# Patient Record
Sex: MALE | Race: WHITE | NOT HISPANIC OR LATINO | Employment: OTHER | URBAN - METROPOLITAN AREA
[De-identification: names, ages, dates, MRNs, and addresses within clinical notes are randomized per-mention and may not be internally consistent; named-entity substitution may affect disease eponyms.]

---

## 2024-10-01 ENCOUNTER — TELEPHONE (OUTPATIENT)
Dept: UROLOGY | Facility: CLINIC | Age: 74
End: 2024-10-01
Payer: MEDICARE

## 2024-10-01 NOTE — TELEPHONE ENCOUNTER
Pt was unable to make appt today, because he was lost. Pt was rescheduled to Thurs 10/03. Called pt to find out the nature of his appt. Appt reason was unclear. Left voicemail and call back number. QR

## 2024-10-03 ENCOUNTER — OFFICE VISIT (OUTPATIENT)
Dept: UROLOGY | Facility: CLINIC | Age: 74
End: 2024-10-03
Payer: MEDICARE

## 2024-10-03 VITALS — WEIGHT: 182.13 LBS | HEIGHT: 69 IN | BODY MASS INDEX: 26.97 KG/M2

## 2024-10-03 DIAGNOSIS — Z85.47 HISTORY OF TESTICULAR CANCER: ICD-10-CM

## 2024-10-03 DIAGNOSIS — N50.89 TESTICULAR MASS: Primary | ICD-10-CM

## 2024-10-03 PROCEDURE — 99213 OFFICE O/P EST LOW 20 MIN: CPT | Mod: PBBFAC,PO

## 2024-10-03 PROCEDURE — 99203 OFFICE O/P NEW LOW 30 MIN: CPT | Mod: S$PBB,,,

## 2024-10-03 PROCEDURE — 99999 PR PBB SHADOW E&M-EST. PATIENT-LVL III: CPT | Mod: PBBFAC,,,

## 2024-10-03 RX ORDER — BUPROPION HYDROCHLORIDE 300 MG/1
300 TABLET ORAL
COMMUNITY
Start: 2024-09-30

## 2024-10-03 RX ORDER — DULOXETIN HYDROCHLORIDE 60 MG/1
60 CAPSULE, DELAYED RELEASE ORAL
COMMUNITY
Start: 2021-09-30

## 2024-10-03 RX ORDER — TRAZODONE HYDROCHLORIDE 50 MG/1
50 TABLET ORAL NIGHTLY
COMMUNITY

## 2024-10-03 RX ORDER — VALACYCLOVIR HYDROCHLORIDE 1 G/1
TABLET, FILM COATED ORAL
COMMUNITY
Start: 1989-09-30

## 2024-10-04 ENCOUNTER — HOSPITAL ENCOUNTER (OUTPATIENT)
Dept: RADIOLOGY | Facility: HOSPITAL | Age: 74
Discharge: HOME | End: 2024-10-04
Payer: MEDICARE

## 2024-10-04 ENCOUNTER — TELEPHONE (OUTPATIENT)
Dept: UROLOGY | Facility: CLINIC | Age: 74
End: 2024-10-04
Payer: MEDICARE

## 2024-10-04 DIAGNOSIS — N50.89 TESTICULAR MASS: ICD-10-CM

## 2024-10-04 PROCEDURE — 93975 VASCULAR STUDY: CPT | Mod: 26,,, | Performed by: RADIOLOGY

## 2024-10-04 PROCEDURE — 76870 US EXAM SCROTUM: CPT | Mod: 26,,, | Performed by: RADIOLOGY

## 2024-10-04 PROCEDURE — 93975 VASCULAR STUDY: CPT | Mod: TC,PO

## 2024-10-04 NOTE — PROGRESS NOTES
"[facility] Note  Staff: JEAN Ybarra    PCP: None    Chief Complaint: Testicular Mass    Subjective:        HPI: Nahid Bauer is a 74 y.o. male NEW PATIENT presents today for evaluation of left testicular mass. 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 He states he first noticed this area about 2 weeks ago. He states it feels different than his prior testicular cancer. He denies swelling or tenderness. He denies dysuria, hematuria, fever, flank pain, and difficulty urinating.     Questions asked the pt during ov today:  Dysuria: No  Gross Hematuria:No  Straining:No, Hesistancy:No, Intermittency:No}, Weak stream:No    Last PSA Screening: No results found for: "PSA", "PSADIAG"    History of Kidney Stones?:  No    Constipation issues?:  No    REVIEW OF SYSTEMS:  Review of Systems   Constitutional: Negative.  Negative for chills and fever.   Gastrointestinal: Negative.  Negative for abdominal pain, constipation, diarrhea, nausea and vomiting.   Genitourinary: Negative.  Negative for dysuria, flank pain, frequency, hematuria and urgency.   Musculoskeletal: Negative.  Negative for back pain.       PMHx:  History reviewed. No pertinent past medical history.    PSHx:  History reviewed. No pertinent surgical history.    Fam Hx:   malignancies: No    kidney stones: No     Soc Hx:  , lives in [place]    Allergies:  Patient has no known allergies.    Medications: reviewed     Objective:   There were no vitals filed for this visit.    Physical Exam  Constitutional:       Appearance: Normal appearance.   Pulmonary:      Effort: Pulmonary effort is normal.   Abdominal:      General: There is no distension.      Palpations: Abdomen is soft.      Tenderness: There is no abdominal tenderness.   Musculoskeletal:         General: Normal range of motion.      Cervical back: Normal range of motion.   Skin:     General: Skin is warm. "   Neurological:      Mental Status: He is alert and oriented to person, place, and time.   Psychiatric:         Mood and Affect: Mood normal.         Behavior: Behavior normal.          EXAM performed by me in office today:  small cyst like area palpated under skin, not attached to testicle  No scrotal rashes, cysts or lesions  Epididymis normal in size, no tenderness  Testes normal and size,  no masses  Urethral meatus normal without discharge      Assessment:       1. Testicular mass    2. History of testicular cancer          Plan:     US Scrotum and Testicles ordered and scheduled    F/u per treatment plan    [facility]: Active    HALEIGH YbarraC

## 2024-10-04 NOTE — TELEPHONE ENCOUNTER
----- Message from Sahra Mcgregor NP sent at 10/4/2024 11:31 AM CDT -----  US shows no masses, normal US and exam

## 2024-10-08 DIAGNOSIS — M25.511 RIGHT SHOULDER PAIN, UNSPECIFIED CHRONICITY: Primary | ICD-10-CM

## 2024-10-09 ENCOUNTER — OFFICE VISIT (OUTPATIENT)
Dept: ORTHOPEDICS | Facility: CLINIC | Age: 74
End: 2024-10-09
Payer: MEDICARE

## 2024-10-09 ENCOUNTER — HOSPITAL ENCOUNTER (OUTPATIENT)
Dept: RADIOLOGY | Facility: HOSPITAL | Age: 74
Discharge: HOME | End: 2024-10-09
Attending: ORTHOPAEDIC SURGERY
Payer: MEDICARE

## 2024-10-09 VITALS — RESPIRATION RATE: 18 BRPM | HEIGHT: 69 IN | WEIGHT: 182.13 LBS | BODY MASS INDEX: 26.97 KG/M2

## 2024-10-09 DIAGNOSIS — M25.511 RIGHT SHOULDER PAIN, UNSPECIFIED CHRONICITY: Primary | ICD-10-CM

## 2024-10-09 DIAGNOSIS — M75.101 TEAR OF RIGHT ROTATOR CUFF, UNSPECIFIED TEAR EXTENT, UNSPECIFIED WHETHER TRAUMATIC: ICD-10-CM

## 2024-10-09 DIAGNOSIS — M75.100 ROTATOR CUFF SYNDROME, UNSPECIFIED LATERALITY: Primary | ICD-10-CM

## 2024-10-09 DIAGNOSIS — M25.511 RIGHT SHOULDER PAIN, UNSPECIFIED CHRONICITY: ICD-10-CM

## 2024-10-09 PROCEDURE — 73030 X-RAY EXAM OF SHOULDER: CPT | Mod: TC,PO,RT

## 2024-10-09 PROCEDURE — 99204 OFFICE O/P NEW MOD 45 MIN: CPT | Mod: S$PBB,,, | Performed by: ORTHOPAEDIC SURGERY

## 2024-10-09 PROCEDURE — 73030 X-RAY EXAM OF SHOULDER: CPT | Mod: 26,RT,, | Performed by: RADIOLOGY

## 2024-10-09 PROCEDURE — 99999 PR PBB SHADOW E&M-EST. PATIENT-LVL III: CPT | Mod: PBBFAC,,, | Performed by: ORTHOPAEDIC SURGERY

## 2024-10-09 PROCEDURE — 99213 OFFICE O/P EST LOW 20 MIN: CPT | Mod: PBBFAC,25,PO | Performed by: ORTHOPAEDIC SURGERY

## 2024-10-09 NOTE — PROGRESS NOTES
History reviewed. No pertinent past medical history.    History reviewed. No pertinent surgical history.    Current Outpatient Medications   Medication Sig    buPROPion (WELLBUTRIN XL) 300 MG 24 hr tablet 300 mg.    DULoxetine (CYMBALTA) 60 MG capsule 60 mg.    traZODone (DESYREL) 50 MG tablet Take 50 mg by mouth every evening.    valACYclovir (VALTREX) 1000 MG tablet      No current facility-administered medications for this visit.       Review of patient's allergies indicates:  No Known Allergies    No family history on file.    Social History     Socioeconomic History    Marital status:      Social Drivers of Health     Financial Resource Strain: Low Risk  (9/30/2024)    Overall Financial Resource Strain (CARDIA)     Difficulty of Paying Living Expenses: Not very hard   Food Insecurity: Food Insecurity Present (9/30/2024)    Hunger Vital Sign     Worried About Running Out of Food in the Last Year: Sometimes true     Ran Out of Food in the Last Year: Never true   Physical Activity: Insufficiently Active (9/30/2024)    Exercise Vital Sign     Days of Exercise per Week: 3 days     Minutes of Exercise per Session: 30 min   Stress: No Stress Concern Present (9/30/2024)    Citizen of [place] [employer] - Occupational Stress Questionnaire     Feeling of Stress : Only a little   Housing Stability: High Risk (9/30/2024)    Housing Stability Vital Sign     Unable to Pay for Housing in the Last Year: Yes       Chief Complaint:   Chief Complaint   Patient presents with    Right Shoulder - Pain       History of present illness:  74-year-old right-hand-dominant male seen for right shoulder pain that started in August.  Patient remembers aggravating it while moving.  Patient was having to a lot of lifting.  Patient has tried Tylenol but it does not improve.  Pain particularly with external rotation.  Pain along the front of his shoulder as well as along the lateral shoulder.  Pain is an 8/10 and not getting  better.        Review of Systems:    Constitution: Negative for chills, fever, and sweats.  Negative for unexplained weight loss.    HENT:  Negative for headaches and blurry vision.    Cardiovascular:Negative for chest pain or irregular heart beat. Negative for hypertension.    Respiratory:  Negative for cough and shortness of breath.    Gastrointestinal: Negative for abdominal pain, heartburn, melena, nausea, and vomitting.    Genitourinary:  Negative bladder incontinence and dysuria.    Musculoskeletal:  See HPI    Neurological: Negative for numbness.    Psychiatric/Behavioral: Negative for depression.  The patient is not nervous/anxious.      Endocrine: Negative for polyuria    Hematologic/Lymphatic: Negative for bleeding problem.  Does not bruise/bleed easily.    Skin: Negative for poor would healing and rash      Physical Examination:    Vital Signs:    Vitals:    10/09/24 1411   Resp: 18       Body mass index is 27.29 kg/m².    This a well-developed, well nourished patient in no acute distress.  They are alert and oriented and cooperative to examination.  Pt. walks without an antalgic gait.      Examination of the right shoulder shows no rashes or erythema. There are no masses, ecchymosis, or atrophy. The patient has full range of motion in forward flexion, external rotation, and internal rotation to the mid T-spine. The patient has positive Sotomayor test. - Neer - Loudoun's test.  Positive Speeds test.  Pain particularly with external rotation Nontender to palpation over a.c. joint. Normal stability anteriorly, posteriorly, and negative sulcus sign. Passive range of motion: Forward flexion of 180°, external rotation at 90° of 90°, internal rotation of 50°, and external rotation at 0° of 50°. 2+ radial pulse. Intact axillary, radial, median and ulnar sensation.  4+ out of 5 resisted forward flexion, external rotation, and negative lift off test.      X-rays:  X-rays of the right shoulder ordered and reviewed  which show small ossicle within the AC joint.  Well-maintained glenohumeral joint         Assessment:  Possible right rotator cuff tear    Plan:  I reviewed the findings with him today.  Recommended an MRI to evaluate the right shoulder.  Patient had acute incident while lifting a lot of boxes in his had worsening shoulder pain now for 2 months.  Follow up after the MRI is completed.            All previous pertinent notes including ER visits, physical therapy visits, other orthopedic visits as well as other care for the same musculoskeletal problem were reviewed.  All pertinent lab values and previous imaging was reviewed pertinent to the current visit.    This note was created using [employer] voice recognition software that occasionally misinterpreted phrases or words.    Consult note is delivered via Epic messaging service.

## 2025-04-15 ENCOUNTER — TELEPHONE (OUTPATIENT)
Dept: PSYCHIATRY | Facility: CLINIC | Age: 75
End: 2025-04-15
Payer: MEDICARE

## 2025-04-16 ENCOUNTER — OFFICE VISIT (OUTPATIENT)
Dept: FAMILY MEDICINE | Facility: CLINIC | Age: 75
End: 2025-04-16
Payer: MEDICARE

## 2025-04-16 ENCOUNTER — PATIENT MESSAGE (OUTPATIENT)
Dept: FAMILY MEDICINE | Facility: CLINIC | Age: 75
End: 2025-04-16

## 2025-04-16 VITALS
HEART RATE: 99 BPM | SYSTOLIC BLOOD PRESSURE: 138 MMHG | WEIGHT: 187.63 LBS | HEIGHT: 68 IN | BODY MASS INDEX: 28.44 KG/M2 | TEMPERATURE: 99 F | OXYGEN SATURATION: 100 % | DIASTOLIC BLOOD PRESSURE: 76 MMHG

## 2025-04-16 DIAGNOSIS — F32.A CHRONIC DEPRESSION: ICD-10-CM

## 2025-04-16 DIAGNOSIS — R94.2 ABNORMAL LUNG SCAN: ICD-10-CM

## 2025-04-16 DIAGNOSIS — E66.3 OVERWEIGHT: ICD-10-CM

## 2025-04-16 DIAGNOSIS — G47.09 OTHER INSOMNIA: ICD-10-CM

## 2025-04-16 DIAGNOSIS — Z13.6 ENCOUNTER FOR ABDOMINAL AORTIC ANEURYSM (AAA) SCREENING: ICD-10-CM

## 2025-04-16 DIAGNOSIS — Z98.890 HISTORY OF PROSTATE BIOPSY: ICD-10-CM

## 2025-04-16 DIAGNOSIS — Z76.89 ENCOUNTER TO ESTABLISH CARE: Primary | ICD-10-CM

## 2025-04-16 DIAGNOSIS — Z13.220 LIPID SCREENING: ICD-10-CM

## 2025-04-16 PROBLEM — F17.211 CIGARETTE NICOTINE DEPENDENCE IN REMISSION: Status: ACTIVE | Noted: 2025-04-16

## 2025-04-16 PROBLEM — C62.11 MALIGNANT NEOPLASM OF DESCENDED RIGHT TESTIS: Status: ACTIVE | Noted: 2025-04-16

## 2025-04-16 PROBLEM — Z90.79 HISTORY OF UNILATERAL ORCHIECTOMY: Status: ACTIVE | Noted: 2025-04-16

## 2025-04-16 PROCEDURE — 99214 OFFICE O/P EST MOD 30 MIN: CPT | Mod: PBBFAC,PO | Performed by: STUDENT IN AN ORGANIZED HEALTH CARE EDUCATION/TRAINING PROGRAM

## 2025-04-16 PROCEDURE — 99999 PR PBB SHADOW E&M-EST. PATIENT-LVL IV: CPT | Mod: PBBFAC,,, | Performed by: STUDENT IN AN ORGANIZED HEALTH CARE EDUCATION/TRAINING PROGRAM

## 2025-04-16 RX ORDER — DULOXETIN HYDROCHLORIDE 60 MG/1
60 CAPSULE, DELAYED RELEASE ORAL DAILY
Qty: 90 CAPSULE | Refills: 0 | Status: SHIPPED | OUTPATIENT
Start: 2025-04-16

## 2025-04-16 RX ORDER — TRAZODONE HYDROCHLORIDE 50 MG/1
50 TABLET ORAL NIGHTLY
Qty: 90 TABLET | Refills: 0 | Status: SHIPPED | OUTPATIENT
Start: 2025-04-16

## 2025-04-16 NOTE — PROGRESS NOTES
Chief Complaint   Patient presents with    Establish Care     Medication refills.        Subjective   Patient ID: Campbell Bauer is a 74 y.o. male.    HPI  Nahid Bauer is a 74 y.o. with a PMHx depression, hx of cigarette use who presents today to establish care with provider.   The patients previous PCP was Sandy Cruz and they last saw them on 6/2024 in [place]. He moved at the end of 8/2024    Concerns today:   1)depression and insomnia - for a couple of years for which he was being seen by psychiatrist - Dr Author ERICA Lockhart  2)he was told he has a spot on his lung that he was having monitored      Current medication list: wellbutrin 300mg XR, cymblata 60mg, trazodone 50mg nightly   Allergies: lisinopril   PSHx: left knee mencisus surgery; right testicular cancer s/p surgical removal in 1996   PFHx: father - prostate cancer    Social  Tobacco use:  quit 39yrs old - smoked for 25-30yrs for 1/2PPD   Alcohol use: rarely   Illicit drug use: marijuana   Lives with: wife  Employment/Work: massage therapist at [employer]   Any children: daghter and 2 sons     Lifestyle  Current exercise regimen:no structured routine   Current diet: cook mostly     Screening Hx:  Dental care: >10-12yrs ago  Annual eye exam: 8/28/2023      PHQ9 screen:       4/16/2025   PHQ-9 Depression Patient Health Questionnaire   Over the last two weeks how often have you been bothered by little interest or pleasure in doing things 1   Over the last two weeks how often have you been bothered by feeling down, depressed or hopeless 0      Prostate 11/24/2020 - bx  Colorectal cancer screening - Last colonoscopy: within the last 5yrs   - Starting age 45 in high risk populations. pt average risk  Lung cancer screening - LDCT: NA   - Risk factors: age 50 to 80 who have 20-pack year smoking hx and currently smoke or have quit within the last 15 years  DEXA - Last scan: NA   - A 65, or younger if risk factors. Use FRAX or SCORE  AAA screening - Last US:never  "had one   - Male age 65-75 with hx of smoking or family hx      Review of Systems  Objective      Body mass index is 28.53 kg/m².  Vitals:    04/16/25 1450   BP: 138/76   Pulse: 99   Temp: 98.6 °F (37 °C)   TempSrc: Oral   SpO2: 100%   Weight: 85.1 kg (187 lb 9.8 oz)   Height: 5' 8" (1.727 m)      Physical Exam    Procedures     No results found for this or any previous visit.    Assessment & Plan   Encounter to establish care  - Patient is hemodynamically stable with no acute findings on physical exam.  -     Comprehensive Metabolic Panel; Future; Expected date: 04/16/2025    History of prostate biopsy  - He reports that he has had 2 biopsies before - most recent was 11/24/2020  -     PSA Total (Reflex To Free); Future; Expected date: 04/16/2025    Lipid screening  -     Lipid Panel; Future; Expected date: 04/16/2025    Chronic depression  - The patient reports that he is on Cymbalta 60mg and Wellbutrin XL 300mg from his previous psychiatrist Dr Lockhart in [place]. He would like to be seen by psychiatry in [place] area   -     Ambulatory referral/consult to Psychiatry; Future; Expected date: 04/23/2025  -     DULoxetine (CYMBALTA) 60 MG capsule; Take 1 capsule (60 mg total) by mouth once daily.  Dispense: 90 capsule; Refill: 0    Other insomnia  - The patient reports that he has been on trazodone for sleep from his psychiatrist Dr. Lockhart  -     Ambulatory referral/consult to Psychiatry; Future; Expected date: 04/23/2025  -     traZODone (DESYREL) 50 MG tablet; Take 1 tablet (50 mg total) by mouth every evening.  Dispense: 90 tablet; Refill: 0    Abnormal lung scan  - The patient reports that he has been getting monitored for a lung "spot" by a pulmonologist in [place]. He states he is due for follow up  -     Ambulatory referral/consult to Pulmonology; Future; Expected date: 04/23/2025    Encounter for abdominal aortic aneurysm (AAA) screening  - Hx of smoking 1/2PPD for 25-30yrs - he quit 37yrs ago  -     US Abdominal " Aorta; Future; Expected date: 04/16/2025            Patient informed of transferring medical history from prior provider to [facility].   No follow-ups on file.

## 2025-04-17 NOTE — TELEPHONE ENCOUNTER
Sent a message that provider is out of the office. A release of information is needed to get his records from another provider.

## 2025-04-21 ENCOUNTER — PATIENT MESSAGE (OUTPATIENT)
Dept: PSYCHIATRY | Facility: CLINIC | Age: 75
End: 2025-04-21
Payer: MEDICARE

## 2025-05-01 ENCOUNTER — LAB VISIT (OUTPATIENT)
Dept: LAB | Facility: HOSPITAL | Age: 75
End: 2025-05-01
Attending: STUDENT IN AN ORGANIZED HEALTH CARE EDUCATION/TRAINING PROGRAM
Payer: MEDICARE

## 2025-05-01 DIAGNOSIS — E66.3 OVERWEIGHT: ICD-10-CM

## 2025-05-01 DIAGNOSIS — Z76.89 ENCOUNTER TO ESTABLISH CARE: ICD-10-CM

## 2025-05-01 DIAGNOSIS — Z13.220 LIPID SCREENING: ICD-10-CM

## 2025-05-01 LAB
ALBUMIN SERPL BCP-MCNC: 3.6 G/DL (ref 3.5–5.2)
ALP SERPL-CCNC: 78 UNIT/L (ref 40–150)
ALT SERPL W/O P-5'-P-CCNC: 19 UNIT/L (ref 10–44)
ANION GAP (OHS): 6 MMOL/L (ref 8–16)
AST SERPL-CCNC: 19 UNIT/L (ref 11–45)
BILIRUB SERPL-MCNC: 0.5 MG/DL (ref 0.1–1)
BUN SERPL-MCNC: 30 MG/DL (ref 8–23)
CALCIUM SERPL-MCNC: 8.7 MG/DL (ref 8.7–10.5)
CHLORIDE SERPL-SCNC: 108 MMOL/L (ref 95–110)
CHOLEST SERPL-MCNC: 178 MG/DL (ref 120–199)
CHOLEST/HDLC SERPL: 4 {RATIO} (ref 2–5)
CO2 SERPL-SCNC: 25 MMOL/L (ref 23–29)
CREAT SERPL-MCNC: 1.1 MG/DL (ref 0.5–1.4)
GFR SERPLBLD CREATININE-BSD FMLA CKD-EPI: >60 ML/MIN/1.73/M2
GLUCOSE SERPL-MCNC: 93 MG/DL (ref 70–110)
HDLC SERPL-MCNC: 45 MG/DL (ref 40–75)
HDLC SERPL: 25.3 % (ref 20–50)
LDLC SERPL CALC-MCNC: 102.8 MG/DL (ref 63–159)
NONHDLC SERPL-MCNC: 133 MG/DL
POTASSIUM SERPL-SCNC: 4.7 MMOL/L (ref 3.5–5.1)
PROT SERPL-MCNC: 7 GM/DL (ref 6–8.4)
SODIUM SERPL-SCNC: 139 MMOL/L (ref 136–145)
TRIGL SERPL-MCNC: 151 MG/DL (ref 30–150)

## 2025-05-01 PROCEDURE — 36415 COLL VENOUS BLD VENIPUNCTURE: CPT | Mod: PO

## 2025-05-01 PROCEDURE — 82465 ASSAY BLD/SERUM CHOLESTEROL: CPT

## 2025-05-01 PROCEDURE — 82040 ASSAY OF SERUM ALBUMIN: CPT

## 2025-05-06 ENCOUNTER — TELEPHONE (OUTPATIENT)
Dept: FAMILY MEDICINE | Facility: CLINIC | Age: 75
End: 2025-05-06
Payer: MEDICARE

## 2025-05-06 ENCOUNTER — RESULTS FOLLOW-UP (OUTPATIENT)
Dept: FAMILY MEDICINE | Facility: CLINIC | Age: 75
End: 2025-05-06

## 2025-05-06 NOTE — TELEPHONE ENCOUNTER
Attempted to call patient regarding lab results. No answer. Kirby full. Will send [employer] message

## 2025-05-16 ENCOUNTER — TELEPHONE (OUTPATIENT)
Dept: FAMILY MEDICINE | Facility: CLINIC | Age: 75
End: 2025-05-16
Payer: MEDICARE

## 2025-05-20 ENCOUNTER — PATIENT MESSAGE (OUTPATIENT)
Dept: FAMILY MEDICINE | Facility: CLINIC | Age: 75
End: 2025-05-20
Payer: MEDICARE

## 2025-05-20 ENCOUNTER — TELEPHONE (OUTPATIENT)
Dept: FAMILY MEDICINE | Facility: CLINIC | Age: 75
End: 2025-05-20
Payer: MEDICARE

## 2025-05-20 NOTE — TELEPHONE ENCOUNTER
Attempted to call patient regarding lab results. No answer. LVM.    The 10-year ASCVD risk score (Lou OLIVA, et al., 2019) is: 23%    Values used to calculate the score:      Age: 74 years      Sex: Male      Is Non- : No      Diabetic: No      Tobacco smoker: No      Systolic Blood Pressure: 138 mmHg      Is BP treated: No      HDL Cholesterol: 45 mg/dL      Total Cholesterol: 178 mg/dL

## 2025-05-23 ENCOUNTER — PATIENT MESSAGE (OUTPATIENT)
Dept: FAMILY MEDICINE | Facility: CLINIC | Age: 75
End: 2025-05-23
Payer: MEDICARE

## 2025-05-26 ENCOUNTER — PATIENT MESSAGE (OUTPATIENT)
Dept: FAMILY MEDICINE | Facility: CLINIC | Age: 75
End: 2025-05-26
Payer: MEDICARE

## 2025-05-27 ENCOUNTER — TELEPHONE (OUTPATIENT)
Dept: FAMILY MEDICINE | Facility: CLINIC | Age: 75
End: 2025-05-27
Payer: MEDICARE

## 2025-05-27 NOTE — TELEPHONE ENCOUNTER
Attempted to call patient regarding message sent. No answer. LVM. Will send Behavioral [employer]ognition [employer] message       JYOTII: Pt cancelled 11/20 CT, does not know when he will RS, should 12/5 appt w KR be cancelled as well? [phone]

## 2025-06-03 ENCOUNTER — PATIENT MESSAGE (OUTPATIENT)
Dept: PSYCHIATRY | Facility: CLINIC | Age: 75
End: 2025-06-03
Payer: MEDICARE

## 2025-06-03 DIAGNOSIS — E78.2 MIXED HYPERLIPIDEMIA: Primary | ICD-10-CM

## 2025-06-03 RX ORDER — ROSUVASTATIN CALCIUM 20 MG/1
20 TABLET, COATED ORAL DAILY
Qty: 90 TABLET | Refills: 3 | Status: SHIPPED | OUTPATIENT
Start: 2025-06-03 | End: 2026-06-03

## 2025-06-12 ENCOUNTER — PATIENT MESSAGE (OUTPATIENT)
Dept: PSYCHIATRY | Facility: CLINIC | Age: 75
End: 2025-06-12
Payer: MEDICARE

## 2025-06-22 DIAGNOSIS — G47.09 OTHER INSOMNIA: ICD-10-CM

## 2025-06-22 DIAGNOSIS — F32.A CHRONIC DEPRESSION: ICD-10-CM

## 2025-06-22 RX ORDER — DULOXETIN HYDROCHLORIDE 60 MG/1
60 CAPSULE, DELAYED RELEASE ORAL DAILY
Qty: 90 CAPSULE | Refills: 0 | OUTPATIENT
Start: 2025-06-22

## 2025-06-22 RX ORDER — TRAZODONE HYDROCHLORIDE 50 MG/1
50 TABLET ORAL NIGHTLY
Qty: 90 TABLET | Refills: 0 | OUTPATIENT
Start: 2025-06-22

## 2025-06-25 DIAGNOSIS — G47.09 OTHER INSOMNIA: ICD-10-CM

## 2025-06-25 DIAGNOSIS — F32.A CHRONIC DEPRESSION: ICD-10-CM

## 2025-06-25 RX ORDER — BUPROPION HYDROCHLORIDE 300 MG/1
300 TABLET ORAL DAILY
Qty: 45 TABLET | Refills: 0 | Status: SHIPPED | OUTPATIENT
Start: 2025-06-25

## 2025-06-25 RX ORDER — TRAZODONE HYDROCHLORIDE 50 MG/1
50 TABLET ORAL NIGHTLY
Qty: 45 TABLET | Refills: 0 | Status: SHIPPED | OUTPATIENT
Start: 2025-06-25

## 2025-06-25 RX ORDER — DULOXETIN HYDROCHLORIDE 60 MG/1
60 CAPSULE, DELAYED RELEASE ORAL DAILY
Qty: 45 CAPSULE | Refills: 0 | Status: SHIPPED | OUTPATIENT
Start: 2025-06-25 | End: 2025-06-27 | Stop reason: SDUPTHER

## 2025-06-25 NOTE — TELEPHONE ENCOUNTER
No care due was identified.  [facility] Embedded Care Due Messages. Reference number: [id number].   6/25/2025 8:09:21 AM CDT

## 2025-06-26 ENCOUNTER — NURSE TRIAGE (OUTPATIENT)
Dept: ADMINISTRATIVE | Facility: CLINIC | Age: 75
End: 2025-06-26
Payer: MEDICARE

## 2025-06-26 NOTE — TELEPHONE ENCOUNTER
"Patient called for assistance with refilling his prescription for Duloxetine/Cymbalta. Patient states he requested a refill of this medication and refill was submitted to his Pharmacy on  yesterday, 6/25/25. Patient states his usual dosage for Duloxetine is 120 mg, daily and prescription was submitted for Duloxetine 60 mg daily. Patient is requesting that prescription be resubmitted to his Pharmacy for Duloxetine 120 mg.     Patient stated anger and disappointment that his prescription was submitted to his Pharmacy incorrectly  with noticeable emotional shifts during triage. Patient began to use profanities during call and abruptly ended call before the end of the assessment. Triage RN called patient back and call was not answered. Call went to voice messaging and voice message was left for patient to contact the [facility] on Call [facility].     Case encounter forwarded to the clinic Staff of Dr. Kathia Epps for follow up with patient ASAP. Triage RN unable to send a Secure Chat message to patient's PCP due to the "Offline" status of Provider at this time.     Reason for Disposition   Caller has NON-URGENT medicine question about med that PCP prescribed and triager unable to answer question    Additional Information   Negative: New-onset or worsening symptoms, see that protocol (e.g., diarrhea, runny nose, sore throat)   Negative: Medicine question not related to refill or renewal   Negative: Caller (e.g., patient or pharmacist) requesting information about a new medicine   Negative: Caller requesting information unrelated to medicine   Negative: Prescription refill request for ESSENTIAL medicine (i.e., likelihood of harm to patient if not taken) and triager unable to refill per department policy   Negative: Prescription not at pharmacy and was prescribed by PCP recently  (Exception: triager has access to EMR and prescription is recorded there. Go to Home Care and confirm for pharmacy.)   Negative: Pharmacy calling with " prescription questions and triager unable to answer question   Negative: Caller requesting a CONTROLLED substance prescription refill (e.g., narcotics, ADHD medicines)   Negative: Prescription refill request for NON-ESSENTIAL medicine (i.e., no harm to patient if med not taken) and triager unable to refill per department policy    Protocols used: Medication Refill and Renewal Call-A-[place]

## 2025-06-26 NOTE — TELEPHONE ENCOUNTER
I have forward to provider multiple times.  patient state he is taking two duloxetine daily.  Please advise.

## 2025-06-27 ENCOUNTER — TELEPHONE (OUTPATIENT)
Dept: FAMILY MEDICINE | Facility: CLINIC | Age: 75
End: 2025-06-27
Payer: MEDICARE

## 2025-06-27 ENCOUNTER — PATIENT MESSAGE (OUTPATIENT)
Dept: FAMILY MEDICINE | Facility: CLINIC | Age: 75
End: 2025-06-27
Payer: MEDICARE

## 2025-06-27 DIAGNOSIS — F32.A CHRONIC DEPRESSION: ICD-10-CM

## 2025-06-27 RX ORDER — DULOXETIN HYDROCHLORIDE 60 MG/1
60 CAPSULE, DELAYED RELEASE ORAL 2 TIMES DAILY
Qty: 90 CAPSULE | Refills: 0 | Status: SHIPPED | OUTPATIENT
Start: 2025-06-27

## 2025-06-27 NOTE — TELEPHONE ENCOUNTER
----- Message from Olga Lidia sent at 6/27/2025  9:43 AM CDT -----  Contact: Pharmacy  Type:  Pharmacy Calling to Clarify an RX    Name of Caller:Patient    Pharmacy Name:  [facility]/pharmacy #[id number] - [place] - [address]  [address]  [place] [address]  Phone: [phone] Fax: [phone]      Prescription Name:DULoxetine (CYMBALTA) 60 MG capsule    What do they need to clarify?:Patient states that the dose is wrong and states that he is supposed to take two. Patient also states that he needs a 90 day supply for the insurance to cover the meds    Best Call Back Number:[phone]    Additional Information: Please call the patient to advise

## 2025-07-17 ENCOUNTER — OFFICE VISIT (OUTPATIENT)
Dept: URGENT CARE | Facility: CLINIC | Age: 75
End: 2025-07-17
Payer: MEDICARE

## 2025-07-17 VITALS
HEART RATE: 81 BPM | SYSTOLIC BLOOD PRESSURE: 133 MMHG | DIASTOLIC BLOOD PRESSURE: 89 MMHG | RESPIRATION RATE: 18 BRPM | WEIGHT: 187 LBS | TEMPERATURE: 98 F | OXYGEN SATURATION: 96 % | HEIGHT: 68 IN | BODY MASS INDEX: 28.34 KG/M2

## 2025-07-17 DIAGNOSIS — R05.9 COUGH, UNSPECIFIED TYPE: ICD-10-CM

## 2025-07-17 DIAGNOSIS — J10.1 INFLUENZA A: ICD-10-CM

## 2025-07-17 DIAGNOSIS — J40 WHEEZY BRONCHITIS: Primary | ICD-10-CM

## 2025-07-17 LAB
CTP QC/QA: YES
CTP QC/QA: YES
POC MOLECULAR INFLUENZA A AGN: POSITIVE
POC MOLECULAR INFLUENZA B AGN: NEGATIVE
SARS-COV+SARS-COV-2 AG RESP QL IA.RAPID: NEGATIVE

## 2025-07-17 PROCEDURE — 71046 X-RAY EXAM CHEST 2 VIEWS: CPT | Mod: S$GLB,,, | Performed by: RADIOLOGY

## 2025-07-17 RX ORDER — LEVALBUTEROL INHALATION SOLUTION 1.25 MG/3ML
1.25 SOLUTION RESPIRATORY (INHALATION)
Status: COMPLETED | OUTPATIENT
Start: 2025-07-17 | End: 2025-07-17

## 2025-07-17 RX ORDER — LEVALBUTEROL TARTRATE 45 UG/1
1-2 AEROSOL, METERED ORAL EVERY 4 HOURS PRN
Qty: 15 G | Refills: 0 | Status: SHIPPED | OUTPATIENT
Start: 2025-07-17 | End: 2026-07-17

## 2025-07-17 RX ORDER — DOXYCYCLINE 100 MG/1
100 CAPSULE ORAL 2 TIMES DAILY
Qty: 20 CAPSULE | Refills: 0 | Status: SHIPPED | OUTPATIENT
Start: 2025-07-17 | End: 2025-07-17

## 2025-07-17 RX ORDER — BENZONATATE 100 MG/1
100 CAPSULE ORAL 3 TIMES DAILY PRN
Qty: 30 CAPSULE | Refills: 0 | Status: SHIPPED | OUTPATIENT
Start: 2025-07-17 | End: 2025-07-27

## 2025-07-17 RX ORDER — BALOXAVIR MARBOXIL 80 MG/1
80 TABLET, FILM COATED ORAL ONCE
Qty: 1 TABLET | Refills: 0 | Status: SHIPPED | OUTPATIENT
Start: 2025-07-17 | End: 2025-07-17

## 2025-07-17 RX ORDER — IPRATROPIUM BROMIDE 0.5 MG/2.5ML
0.5 SOLUTION RESPIRATORY (INHALATION)
Status: COMPLETED | OUTPATIENT
Start: 2025-07-17 | End: 2025-07-17

## 2025-07-17 RX ADMIN — LEVALBUTEROL INHALATION SOLUTION 1.25 MG: 1.25 SOLUTION RESPIRATORY (INHALATION) at 02:07

## 2025-07-17 RX ADMIN — IPRATROPIUM BROMIDE 0.5 MG: 0.5 SOLUTION RESPIRATORY (INHALATION) at 02:07

## 2025-07-17 NOTE — PROGRESS NOTES
"Subjective:      Patient ID: Nahid Bauer is a 74 y.o. male.    Vitals:  height is 5' 8" (1.727 m) and weight is 84.8 kg (187 lb). His oral temperature is 98.3 °F (36.8 °C). His blood pressure is 133/89 and his pulse is 81. His respiration is 18 and oxygen saturation is 96%.     Chief Complaint: Cough (Entered by patient)    Pt states upper resp. problems like coughing and lethargic x 2 days.  Denies any shortness of breath or difficulty breathing.  Patient does report generalized fatigue and body aches.    Cough  This is a new problem. The current episode started today. The cough is Productive of sputum. Pertinent negatives include no ear pain or fever. Nothing aggravates the symptoms. He has tried nothing for the symptoms. The treatment provided no relief.       Constitution: Negative for fever.   HENT:  Negative for ear pain.    Respiratory:  Positive for cough.       Objective:     Physical Exam   Constitutional: He is oriented to person, place, and time. He appears well-developed. He is cooperative.  Non-toxic appearance. He does not appear ill. No distress.   HENT:   Head: Normocephalic and atraumatic.   Ears:   Right Ear: Hearing, tympanic membrane, external ear and ear canal normal.   Left Ear: Hearing, tympanic membrane, external ear and ear canal normal.   Nose: Nose normal. No mucosal edema, rhinorrhea or nasal deformity. No epistaxis. Right sinus exhibits no maxillary sinus tenderness and no frontal sinus tenderness. Left sinus exhibits no maxillary sinus tenderness and no frontal sinus tenderness.   Mouth/Throat: Uvula is midline, oropharynx is clear and moist and mucous membranes are normal. No trismus in the jaw. Normal dentition. No uvula swelling. No oropharyngeal exudate, posterior oropharyngeal edema or posterior oropharyngeal erythema.   Eyes: Conjunctivae and lids are normal. No scleral icterus.   Neck: Trachea normal and phonation normal. Neck supple. No edema present. No erythema present. No " neck rigidity present.   Cardiovascular: Normal rate, regular rhythm, normal heart sounds and normal pulses.   Pulmonary/Chest: Effort normal. No respiratory distress. He has no decreased breath sounds. He has wheezes in the right upper field, the right middle field, the left upper field and the left middle field. He has no rhonchi.   Abdominal: Normal appearance.   Musculoskeletal: Normal range of motion.         General: No deformity. Normal range of motion.   Neurological: He is alert and oriented to person, place, and time. He exhibits normal muscle tone. Coordination normal.   Skin: Skin is warm, dry, intact, not diaphoretic and not pale.   Psychiatric: His speech is normal and behavior is normal. Judgment and thought content normal.   Nursing note and vitals reviewed.      Assessment:     1. Wheezy bronchitis    2. Cough, unspecified type    3. Influenza A        Plan:       Results for orders placed or performed in visit on 07/17/25   SARS Coronavirus 2 Antigen, POCT Manual Read    Collection Time: 07/17/25  2:17 PM   Result Value Ref Range    SARS Coronavirus 2 Antigen Negative Negative, Presumptive Negative     Acceptable Yes    POCT Influenza A/B MOLECULAR    Collection Time: 07/17/25  2:52 PM   Result Value Ref Range    POC Molecular Influenza A Ag Positive (A) Negative    POC Molecular Influenza B Ag Negative Negative     Acceptable Yes        XR CHEST PA AND LATERAL  Result Date: 7/17/2025  EXAMINATION: XR CHEST PA AND LATERAL CLINICAL HISTORY: Cough, unspecified TECHNIQUE: PA and lateral views of the chest were performed. COMPARISON: None FINDINGS: The lungs are clear, with normal appearance of pulmonary vasculature and no pleural effusion or pneumothorax. The cardiac silhouette is normal in size. The hilar and mediastinal contours are unremarkable. Bones are intact.     No acute abnormality. Electronically signed by: Alanis Talbert MD Date:    07/17/2025  Time:    14:33    Patient educated on CDC guidelines regarding influenza.  Patient is placed on antiviral.  Improvement in expiratory wheezing noted after breathing treatment.  Patient is advised to follow up with PCP.  Strict ER precautions.  All questions answered.  Room air saturation 98%    Wheezy bronchitis  -     ipratropium 0.02 % nebulizer solution 0.5 mg  -     levalbuterol nebulizer solution 1.25 mg  -     levalbuterol (XOPENEX HFA) 45 mcg/actuation inhaler; Inhale 1-2 puffs into the lungs every 4 (four) hours as needed for Wheezing. Rescue  Dispense: 15 g; Refill: 0  -     Discontinue: doxycycline (VIBRAMYCIN) 100 MG Cap; Take 1 capsule (100 mg total) by mouth 2 (two) times daily. for 10 days  Dispense: 20 capsule; Refill: 0    Cough, unspecified type  -     SARS Coronavirus 2 Antigen, POCT Manual Read  -     XR CHEST PA AND LATERAL; Future; Expected date: 07/17/2025  -     ipratropium 0.02 % nebulizer solution 0.5 mg  -     levalbuterol nebulizer solution 1.25 mg  -     POCT Influenza A/B MOLECULAR  -     levalbuterol (XOPENEX HFA) 45 mcg/actuation inhaler; Inhale 1-2 puffs into the lungs every 4 (four) hours as needed for Wheezing. Rescue  Dispense: 15 g; Refill: 0  -     benzonatate (TESSALON) 100 MG capsule; Take 1 capsule (100 mg total) by mouth 3 (three) times daily as needed for Cough.  Dispense: 30 capsule; Refill: 0    Influenza A  -     baloxavir marboxiL (XOFLUZA) 80 mg tablet; Take 1 tablet (80 mg total) by mouth once. for 1 dose  Dispense: 1 tablet; Refill: 0      Patient Instructions   Breathing treatment given in the clinic today.  Tessalon perles as needed for cough.  Xofluza antiviral treatment.  Mucinex until symptoms improve.  Increase fluids.  Follow up with your pcp.  If symptoms worsen go to the ER.        You must understand that you have received treatment at an Urgent Care facility only, and that you may be  released before all of your medical problems are known or treated. Urgent  Care facilities are not equipped to  handle life threatening emergencies. It is recommended that you seek care at an Emergency Department for  further evaluation of worsening or concerning symptoms, or possibly life threatening conditions as  discussed.

## 2025-07-17 NOTE — PATIENT INSTRUCTIONS
Breathing treatment given in the clinic today.  Tessalon perles as needed for cough.  Xofluza antiviral treatment.  Mucinex until symptoms improve.  Increase fluids.  Follow up with your pcp.  If symptoms worsen go to the ER.

## 2025-07-17 NOTE — LETTER
July 17, 2025      [facility]  [address] [place] [address]  Phone: [phone]  Fax: [phone]       Patient: Nahid Bauer   YOB: 1950  Date of Visit: 07/17/2025    To Whom It May Concern:    Jamal Bauer  was at [facility] on 07/17/2025. Please excuse patient for days missed.  If you have any questions or concerns, or if I can be of further assistance, please do not hesitate to contact me.    Sincerely,          Alice Hutson, NP

## 2025-08-07 DIAGNOSIS — F32.A CHRONIC DEPRESSION: ICD-10-CM

## 2025-08-08 ENCOUNTER — OFFICE VISIT (OUTPATIENT)
Dept: FAMILY MEDICINE | Facility: CLINIC | Age: 75
End: 2025-08-08
Payer: MEDICARE

## 2025-08-08 DIAGNOSIS — G47.09 OTHER INSOMNIA: ICD-10-CM

## 2025-08-08 DIAGNOSIS — F32.A CHRONIC DEPRESSION: Primary | ICD-10-CM

## 2025-08-08 RX ORDER — TRAZODONE HYDROCHLORIDE 50 MG/1
50 TABLET ORAL NIGHTLY
Qty: 45 TABLET | Refills: 0 | Status: SHIPPED | OUTPATIENT
Start: 2025-08-08

## 2025-08-08 RX ORDER — BUPROPION HYDROCHLORIDE 300 MG/1
300 TABLET ORAL DAILY
Qty: 90 TABLET | Refills: 0 | Status: SHIPPED | OUTPATIENT
Start: 2025-08-08

## 2025-08-08 RX ORDER — DULOXETIN HYDROCHLORIDE 60 MG/1
120 CAPSULE, DELAYED RELEASE ORAL DAILY
Qty: 180 CAPSULE | Refills: 0 | Status: SHIPPED | OUTPATIENT
Start: 2025-08-08

## 2025-08-08 RX ORDER — DULOXETIN HYDROCHLORIDE 60 MG/1
60 CAPSULE, DELAYED RELEASE ORAL 2 TIMES DAILY
Qty: 90 CAPSULE | Refills: 0 | OUTPATIENT
Start: 2025-08-08

## 2025-08-08 RX ORDER — BUPROPION HYDROCHLORIDE 300 MG/1
300 TABLET ORAL DAILY
Qty: 45 TABLET | Refills: 0 | OUTPATIENT
Start: 2025-08-08
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
## 2025-08-11 ENCOUNTER — TELEPHONE (OUTPATIENT)
Dept: FAMILY MEDICINE | Facility: CLINIC | Age: 75
End: 2025-08-11
Payer: MEDICARE

## 2025-08-11 ENCOUNTER — PATIENT MESSAGE (OUTPATIENT)
Dept: PSYCHIATRY | Facility: CLINIC | Age: 75
End: 2025-08-11
Payer: MEDICARE

## 2025-08-11 DIAGNOSIS — F32.A CHRONIC DEPRESSION: ICD-10-CM

## 2025-08-11 RX ORDER — BUPROPION HYDROCHLORIDE 300 MG/1
300 TABLET ORAL DAILY
Qty: 90 TABLET | Refills: 0 | OUTPATIENT
Start: 2025-08-11

## 2025-08-18 ENCOUNTER — PATIENT MESSAGE (OUTPATIENT)
Dept: PSYCHIATRY | Facility: CLINIC | Age: 75
End: 2025-08-18
Payer: MEDICARE

## 2025-08-20 ENCOUNTER — OFFICE VISIT (OUTPATIENT)
Dept: PSYCHIATRY | Facility: CLINIC | Age: 75
End: 2025-08-20
Payer: MEDICARE

## 2025-08-20 VITALS
HEIGHT: 68 IN | SYSTOLIC BLOOD PRESSURE: 159 MMHG | HEART RATE: 73 BPM | DIASTOLIC BLOOD PRESSURE: 103 MMHG | WEIGHT: 184.5 LBS | BODY MASS INDEX: 27.96 KG/M2

## 2025-08-20 DIAGNOSIS — F33.40 MDD (RECURRENT MAJOR DEPRESSIVE DISORDER) IN REMISSION: Primary | ICD-10-CM

## 2025-08-20 PROCEDURE — 99999 PR PBB SHADOW E&M-EST. PATIENT-LVL III: CPT | Mod: PBBFAC,,, | Performed by: PSYCHIATRY & NEUROLOGY

## 2025-08-20 PROCEDURE — 99213 OFFICE O/P EST LOW 20 MIN: CPT | Mod: PBBFAC,PO | Performed by: PSYCHIATRY & NEUROLOGY

## 2025-08-20 PROCEDURE — 90791 PSYCH DIAGNOSTIC EVALUATION: CPT | Mod: ,,, | Performed by: PSYCHIATRY & NEUROLOGY
